# Patient Record
Sex: MALE | Race: WHITE | NOT HISPANIC OR LATINO | URBAN - NONMETROPOLITAN AREA
[De-identification: names, ages, dates, MRNs, and addresses within clinical notes are randomized per-mention and may not be internally consistent; named-entity substitution may affect disease eponyms.]

---

## 2019-05-28 ENCOUNTER — APPOINTMENT (OUTPATIENT)
Dept: URBAN - NONMETROPOLITAN AREA CLINIC 54 | Age: 84
Setting detail: DERMATOLOGY
End: 2019-05-29

## 2019-05-28 DIAGNOSIS — Z87.2 PERSONAL HISTORY OF DISEASES OF THE SKIN AND SUBCUTANEOUS TISSUE: ICD-10-CM

## 2019-05-28 DIAGNOSIS — B35.3 TINEA PEDIS: ICD-10-CM

## 2019-05-28 PROCEDURE — OTHER PRESCRIPTION SAMPLES PROVIDED: OTHER

## 2019-05-28 PROCEDURE — 99213 OFFICE O/P EST LOW 20 MIN: CPT

## 2019-05-28 PROCEDURE — OTHER MONITORING: OTHER

## 2019-05-28 PROCEDURE — OTHER ADDITIONAL NOTES: OTHER

## 2019-05-28 PROCEDURE — OTHER KOH PREP: OTHER

## 2019-05-28 ASSESSMENT — LOCATION SIMPLE DESCRIPTION DERM: LOCATION SIMPLE: LEFT 2ND TOE

## 2019-05-28 ASSESSMENT — LOCATION DETAILED DESCRIPTION DERM: LOCATION DETAILED: LEFT MEDIAL 2ND TOE

## 2019-05-28 ASSESSMENT — LOCATION ZONE DERM: LOCATION ZONE: TOE

## 2019-05-28 NOTE — PROCEDURE: PRESCRIPTION SAMPLES PROVIDED
Detail Level: Zone
Samples Given: Triple paste AF apply to AA BID x1 month (applied a thin layer to left 2nd toe while in office)

## 2019-05-28 NOTE — PROCEDURE: KOH PREP
Koh Procedure Text (Tissue Harvesting Technique): A 15-blade scalpel, tongue depressor, or edge of a glass slide was used to scrape the affected skin. The scrapings were placed on a glass slide, a drop or two of KOH solution added then coverslip(s) applied. The preparation was then examined under the microscope.